# Patient Record
Sex: FEMALE | Race: BLACK OR AFRICAN AMERICAN
[De-identification: names, ages, dates, MRNs, and addresses within clinical notes are randomized per-mention and may not be internally consistent; named-entity substitution may affect disease eponyms.]

---

## 2018-06-28 ENCOUNTER — HOSPITAL ENCOUNTER (EMERGENCY)
Dept: HOSPITAL 62 - ER | Age: 78
Discharge: HOME | End: 2018-06-28
Payer: MEDICARE

## 2018-06-28 VITALS — DIASTOLIC BLOOD PRESSURE: 70 MMHG | SYSTOLIC BLOOD PRESSURE: 128 MMHG

## 2018-06-28 DIAGNOSIS — R29.810: ICD-10-CM

## 2018-06-28 DIAGNOSIS — R42: ICD-10-CM

## 2018-06-28 DIAGNOSIS — I48.91: ICD-10-CM

## 2018-06-28 DIAGNOSIS — Z85.3: ICD-10-CM

## 2018-06-28 DIAGNOSIS — R25.1: ICD-10-CM

## 2018-06-28 DIAGNOSIS — G45.9: Primary | ICD-10-CM

## 2018-06-28 LAB
ADD MANUAL DIFF: NO
ALBUMIN SERPL-MCNC: 3.8 G/DL (ref 3.5–5)
ALP SERPL-CCNC: 84 U/L (ref 38–126)
ALT SERPL-CCNC: 15 U/L (ref 9–52)
ANION GAP SERPL CALC-SCNC: 9 MMOL/L (ref 5–19)
APTT BLD: 44.5 SEC (ref 23.5–35.8)
AST SERPL-CCNC: 33 U/L (ref 14–36)
BASOPHILS # BLD AUTO: 0 10^3/UL (ref 0–0.2)
BASOPHILS NFR BLD AUTO: 1 % (ref 0–2)
BILIRUB DIRECT SERPL-MCNC: 0.7 MG/DL (ref 0–0.4)
BILIRUB SERPL-MCNC: 1 MG/DL (ref 0.2–1.3)
BUN SERPL-MCNC: 16 MG/DL (ref 7–20)
CALCIUM: 9.5 MG/DL (ref 8.4–10.2)
CHLORIDE SERPL-SCNC: 109 MMOL/L (ref 98–107)
CK MB SERPL-MCNC: 0.59 NG/ML (ref ?–4.55)
CK SERPL-CCNC: 96 U/L (ref 30–135)
CO2 SERPL-SCNC: 27 MMOL/L (ref 22–30)
EOSINOPHIL # BLD AUTO: 0 10^3/UL (ref 0–0.6)
EOSINOPHIL NFR BLD AUTO: 0.6 % (ref 0–6)
ERYTHROCYTE [DISTWIDTH] IN BLOOD BY AUTOMATED COUNT: 14.7 % (ref 11.5–14)
GLUCOSE SERPL-MCNC: 99 MG/DL (ref 75–110)
HCT VFR BLD CALC: 46.9 % (ref 36–47)
HGB BLD-MCNC: 15.5 G/DL (ref 12–15.5)
INR PPP: 1.27
LYMPHOCYTES # BLD AUTO: 1.1 10^3/UL (ref 0.5–4.7)
LYMPHOCYTES NFR BLD AUTO: 23.3 % (ref 13–45)
MCH RBC QN AUTO: 31.3 PG (ref 27–33.4)
MCHC RBC AUTO-ENTMCNC: 33 G/DL (ref 32–36)
MCV RBC AUTO: 95 FL (ref 80–97)
MONOCYTES # BLD AUTO: 0.4 10^3/UL (ref 0.1–1.4)
MONOCYTES NFR BLD AUTO: 9.3 % (ref 3–13)
NEUTROPHILS # BLD AUTO: 3.1 10^3/UL (ref 1.7–8.2)
NEUTS SEG NFR BLD AUTO: 65.8 % (ref 42–78)
PLATELET # BLD: 242 10^3/UL (ref 150–450)
POTASSIUM SERPL-SCNC: 4.4 MMOL/L (ref 3.6–5)
PROT SERPL-MCNC: 6.9 G/DL (ref 6.3–8.2)
PROTHROMBIN TIME: 16.5 SEC (ref 11.4–15.4)
RBC # BLD AUTO: 4.94 10^6/UL (ref 3.72–5.28)
SODIUM SERPL-SCNC: 145.2 MMOL/L (ref 137–145)
TOTAL CELLS COUNTED % (AUTO): 100 %
TROPONIN I SERPL-MCNC: < 0.012 NG/ML
WBC # BLD AUTO: 4.8 10^3/UL (ref 4–10.5)

## 2018-06-28 PROCEDURE — 70450 CT HEAD/BRAIN W/O DYE: CPT

## 2018-06-28 PROCEDURE — 36415 COLL VENOUS BLD VENIPUNCTURE: CPT

## 2018-06-28 PROCEDURE — 80053 COMPREHEN METABOLIC PANEL: CPT

## 2018-06-28 PROCEDURE — 85025 COMPLETE CBC W/AUTO DIFF WBC: CPT

## 2018-06-28 PROCEDURE — 82550 ASSAY OF CK (CPK): CPT

## 2018-06-28 PROCEDURE — 96360 HYDRATION IV INFUSION INIT: CPT

## 2018-06-28 PROCEDURE — 84484 ASSAY OF TROPONIN QUANT: CPT

## 2018-06-28 PROCEDURE — 85730 THROMBOPLASTIN TIME PARTIAL: CPT

## 2018-06-28 PROCEDURE — 93005 ELECTROCARDIOGRAM TRACING: CPT

## 2018-06-28 PROCEDURE — 99285 EMERGENCY DEPT VISIT HI MDM: CPT

## 2018-06-28 PROCEDURE — 85610 PROTHROMBIN TIME: CPT

## 2018-06-28 PROCEDURE — 70551 MRI BRAIN STEM W/O DYE: CPT

## 2018-06-28 PROCEDURE — 82553 CREATINE MB FRACTION: CPT

## 2018-06-28 PROCEDURE — 93010 ELECTROCARDIOGRAM REPORT: CPT

## 2018-06-28 PROCEDURE — 71045 X-RAY EXAM CHEST 1 VIEW: CPT

## 2018-06-28 NOTE — RADIOLOGY REPORT (SQ)
EXAM DESCRIPTION:  CT HEAD WITHOUT



COMPLETED DATE/TIME:  6/28/2018 9:54 am



REASON FOR STUDY:  Stroke protocol



COMPARISON:  None.



TECHNIQUE:  Axial images acquired through the brain without intravenous contrast.  Images reviewed wi
th bone, brain and subdural windows.   Images stored on PACS.

All CT scanners at this facility use dose modulation, iterative reconstruction, and/or weight based d
osing when appropriate to reduce radiation dose to as low as reasonably achievable (ALARA).

[facility]: Dose Right  [facility]: CareDose    [facility]: Dose Right    [facility]: Teradose 4D    [facility]: Smart Technologies



RADIATION DOSE:  CT Rad equipment meets quality standard of care and radiation dose reduction techniq
ues were employed. CTDIvol: 53.2 mGy. DLP: 937 mGy-cm. mGy.



LIMITATIONS:  None.



FINDINGS:  VENTRICLES: Normal size and contour.

CEREBRUM: No masses.  No hemorrhage.  No midline shift.  No evidence for acute infarction. Normal gra
y/white matter differentiation. No areas of low density in the white matter.  Basal ganglion calcific
ation not significant.

CEREBELLUM: No masses.  No hemorrhage.  No alteration of density.  No evidence for acute infarction.

EXTRAAXIAL SPACES: No fluid collections.  No masses.

ORBITS AND GLOBE: No intra- or extraconal masses.  Normal contour of globe without masses.

CALVARIUM: No fracture.

PARANASAL SINUSES: No fluid or mucosal thickening.

SOFT TISSUES: No mass or hematoma.

OTHER: No other significant finding.



IMPRESSION:  NORMAL BRAIN CT WITHOUT CONTRAST.

EVIDENCE OF ACUTE STROKE: NO.



COMMENT:   Pertinent positive or negative findings of the imaging study reported as a CRITICAL EXAM urban WELLER MD at10:12 on 6/28/2018.

Category of Critical Exam: Code stroke

Quality ID # 436: Final reports with documentation of one or more dose reduction techniques (e.g., Au
tomated exposure control, adjustment of the mA and/or kV according to patient size, use of iterative 
reconstruction technique)



TECHNICAL DOCUMENTATION:  JOB ID:  [id number]

 2011 [employer]- All Rights Reserved



Reading location - IP/workstation name: [email or web address]

## 2018-06-28 NOTE — ER DOCUMENT REPORT
ED Neuro Symptoms/Deficit





- General


Chief Complaint: S/S of Possible Stroke


Stated Complaint: LEFT SIDE PAIN


Time Seen by Provider: 06/28/18 09:33


Mode of Arrival: Ambulatory


Information source: Patient, Relative


TRAVEL OUTSIDE OF THE [place] IN LAST 30 DAYS: No





- HPI


Patient complains to provider of: Facial Droop, Speech Impairment, Other - 

TREMOR R.U.E.


Onset: Yesterday - LAST PM


Awoke with symptoms: No


Symptoms are: Intermittent episodes


Duration: More than 3 hrs


Quality of pain: No pain


Severity: Moderate


Context: denies: Insect bite, Tick bite, Falling, Head injury, Other


Loss of consciousness: No loss of consciousness


Was STROKE ALERT Called: No


Baseline Cognitive: Alert, oriented X 3


Baseline Gait: Walks w/o assistance


Pre-existing weakness: No: Face, General, Hand, Lower extremity, Upper extremity


Alert To: Name/Voice


Patient Orientation: Person


Associated symptoms: Lightheadedness.  denies: Falling injury


Similar symptoms previously: No


Recently seen / treated by doctor: No





- Related Data


Allergies/Adverse Reactions: 


 





No Known Allergies Allergy (Verified 06/28/18 09:24)


 











Past Medical History





- General


Information source: Patient, Relative





- Social History


Smoking Status: Never Smoker


Cigarette use (# per day): No


Chew tobacco use (# tins/day): No


Frequency of alcohol use: None


Drug Abuse: None


Lives with: Alone


Family History: Reviewed & Not Pertinent


Patient has suicidal ideation: No


Patient has homicidal ideation: No





- Past Medical History


Cardiac Medical History: Reports: Hx Atrial Fibrillation - TAKES ELIQUIS


Pulmonary Medical History: Reports: None


EENT Medical History: Reports: None


Neurological Medical History: Reports: None


Endocrine Medical History: Reports: None


Renal/ Medical History: Reports: None.  Denies: Hx Peritoneal Dialysis


Malignancy Medical History: Reports: Hx Breast Cancer


GI Medical History: Reports: None


Musculoskeltal Medical History: Reports None


Psychiatric Medical History: Reports: None


Past Surgical History: Reports: Hx Breast Surgery





- Immunizations


Hx Pneumococcal Vaccination: 10/01/15





Review of Systems





- Review of Systems


Constitutional: No symptoms reported


EENT: No symptoms reported


Cardiovascular: See HPI


Respiratory: No symptoms reported


Gastrointestinal: No symptoms reported


Genitourinary: No symptoms reported


Female Genitourinary: Post menopausal


Musculoskeletal: No symptoms reported


Skin: No symptoms reported


Neurological/Psychological: See HPI





Physical Exam





- Vital signs


Vitals: 


 











Temp Pulse Resp BP Pulse Ox


 


 98.3 F   72   20   135/89 H  100 


 


 06/28/18 09:31  06/28/18 09:31  06/28/18 09:31  06/28/18 09:31  06/28/18 09:31











Interpretation: Hypertensive.  No: Tachycardic, Tachypneic





- General


General appearance: Appears well, Alert


In distress: None





- HEENT


Head: Normocephalic


Eyes: Normal


Conjunctiva: Normal


Ears: Normal


Nasal: Normal


Mouth/Lips: Normal


Mucous membranes: Normal


Pharynx: Normal


Neck: Normal





- Respiratory


Respiratory status: No respiratory distress


Breath sounds: Normal





- Cardiovascular


Rhythm: Regular


Heart sounds: Normal auscultation


Murmur: No





- Abdominal


Inspection: Normal


Distension: No distension





- Extremities


General upper extremity: Normal inspection


General lower extremity: Normal inspection





- Neurological


Neuro grossly intact: Yes


Cognition: Normal


Orientation: AAOx4


Paxton Coma Scale Eye Opening: Spontaneous


Speech: Normal


Cranial nerves: Normal.  No: Facial palsy, Forehead sparing, Tongue deviation


Cerebellar coordination: Normal


Motor strength normal: LUE, RUE, LLE, RLE


Additional motor exam normals: Equal , Other - RESTING TREMOR R.U.E.


Sensory: Normal





- Psychological


Associated symptoms: Normal affect, Normal mood





- Skin


Skin Temperature: Warm


Skin Moisture: Dry


Skin Color: Normal


Skin Turgor: Elastic





Course





- Re-evaluation


Re-evalutation: 





06/28/18 16:04Patient states she feels "just fine."  Tremor and right upper 

extremity has apparently resolved.  Results of laboratory testing and 

radiographic studies discussed with patient and family.  There is some evidence 

to suggest mild dehydration, so we will administer some IV fluids, repeat 

orthostatic vital signs, and discharged to outpatient follow-up if all is 

satisfactory.


06/28/18 17:51


Patient remains asymptomatic.  Orthostatic vital signs are acceptable.  Patient'

s only complaint is that she is hungry.





- Vital Signs


Vital signs: 


 











Temp Pulse Resp BP Pulse Ox


 


 98.3 F   72   20   135/89 H  94 


 


 06/28/18 09:31  06/28/18 09:42  06/28/18 09:42  06/28/18 09:42  06/28/18 09:48














- Laboratory


Result Diagrams: 


 06/28/18 10:20





 06/28/18 11:21


Laboratory results interpreted by me: 


 











  06/28/18





  10:20


 


PT  16.5 H


 


APTT  44.5 H














- EKG Interpretation by Me


EKG shows normal: Sinus rhythm, Axis, Intervals, QRS Complexes, ST-T Waves


Rate: Normal


Rhythm: NSR


P Waves: LAE





ED Alteplase Inc/Exc Criteria





- Date/Time patient last known well:


Date/Time: 06/27/2018  2100 HRS





- Inclusion Criteria:


1: Patient presented to ED within 3 hours of acute ischemic stroke symptom onset

?


-: No


2: Did baseline CT exclude intracranial hemorrhage and/or other risk factors?


-: Yes


3: Is the age of the patient 18 years of age or greater?


-: Yes


*****: If any of the above questions are answered "NO" then stop, patient is 

not a candidate for Alteplase,


*****: If all of the above questions are answered "YES" then continue with 

Exclusion Criteria.





- Exclusion Criteria:


1: Is there evidence of intracranial hemorrhage on baseline CT?


2: Is there suspicion of subarachnoid hemorrhage (even if CT negative)?


3: Is there a history of serious head trauma, recent previous stroke or MI 

within 3 months?


4: Does the patient have a clinical presentation consistent with MI or post-MI 

pericarditis?


5: Is there history of intracranial hemorrhage?


6: On repeated measurement is Systolic BP greater than 185mmHg or Diastolic BP 

greater that 110 mmHg and is aggressive treatment needed to reduce blood 

pressure to these limits (e.g. constant infusion of an anti-hypertensive)?


7: Did the patient awake with stroke symptoms?


8: Has the patient had a lumbar puncture or an arterial puncture at a non-

compressile site within 7 days?


9: With in the last 14 days did the patient have surgery or major trauma?


10: Is the patient pregnant or postpartum less than 2 weeks?


11: Was there any active bleeding or acute trauma?


12: Does the patient have intracranial neoplasm, arteriovenous malformation or 

aneurysm?


13: Does the patient have abnormal glucose (less than 50 or greater than 400mg/

dl)?  Record glucose in Comment.


14: Patient has rapidly improving symptoms at the time Alteplase is to be 

Administered.


15: Does the patient have any risks for bleeding, including but not limited to:


a.: Current use of Coumadin with PT greater than 15 seconds or INR greater than 

1.7.


b.: Current use of Pradaxa (Dabigatran).


c.: Heparin administereed within the past 48 hours and PTT elevated.


d.: Platelet count less than 100,000/mm.


e.: Major surgery or serious trauma within 14 days.


f.: Gastrointestinal or gynecological urinary bleeding within 14 days.


g.: Myocardial Infarction (MI) within 3 months.


*****: If the answer to any of the above questions is "YES" then stop, the 

patient is not a candidate for Alteplase.


*****: If the answer to all of the above questions is "NO" then the patient may 

be eligible for the Administration of Alteplase.


*****: If the patient is noted to have seizure activity at onset of Stroke 

symptoms; Consult Neurologist for further evaluation.





- The patient is:


-: Included and is eligible to receive Alteplase.  *Initiate bed placement at 

higher level of care*


Reviewd risks & benefits of thrombolytic therapy: I have reviewed the risks and 

benefits of thrombolytic therapy with the patient and/or his/her family.


-: Excluded and not eligible to receive Alteplase for the above exclusions.


-: Excluded and not eligible to receive Alteplase for other reasons (specify in 

comments):





- Diagnosis of TIA:


-: Patient presented with transient symptoms that are now resolved and no other 

neurologic findings are currently present. List symptoms in comments.


-: Patient is NOT a candidate for tPA.


-:  ____(put name in comment)______ has been consulted for admission and 

continued evaluation of risk factor assessment.





Discharge





- Discharge


Clinical Impression: 


 Tremor, Atrial fibrillation with controlled ventricular rate





TIA (transient ischemic attack)


Qualifiers:


 Transient cerebral ischemia type: unspecified Qualified Code(s): G45.9 - 

Transient cerebral ischemic attack, unspecified





Condition: Stable


Disposition: HOME, SELF-CARE


Instructions:  Transient Ischemic Attack ([facility])


Additional Instructions: 


REST, DRINK PLENTY OF FLUIDS.


CONTINUE YOUR PRESENT MEDICATIONS AS USUAL.


FOLLOW UP WITH YOUR PRIMARY CARE PROVIDER OR RETURN TO E.R. AS NEEDED.

## 2018-06-28 NOTE — ER DOCUMENT REPORT
ED Medical Screen (RME)





- General


Chief Complaint: S/S of Possible Stroke


Stated Complaint: LEFT SIDE PAIN


Time Seen by Provider: 06/28/18 09:33


Notes: 





RAPID MEDICAL EVALUATION DISCLOSURE


I have seen this patient as part of a Rapid Medical Evaluation and, if 

applicable, placed any initially appropriate orders. The patient will be seen 

and fully evaluated, including a full history and physical exam, by a provider (

in Main ED or Fast Track) when a room becomes available.





------------------------------------------------------------------





78-year-old female here with daughter who states that yesterday (approximately 

5 PM) she noticed a left facial droop and her right arm started shaking and 

numbness.  This morning she had an episode of syncope approximately 4 hours 

ago.  Daughter also states that she is speaking very "slowed".  She has not 

tried anything for the symptoms.  She denies any prior history of stroke 

hypertension diabetes hyperlipidemia.  She does take a blood thinner, Eliquis.





EXAM


Slight right facial droop


RUE dysmetria noted


Tremulous RUE noted


Motor strength 5/5 with intact sensation LUE


Motor strength 5/5 with sensory deficit RUE


Motor strength 5/5 with intact sensation lower extremities





MDM


Patient does not meet TPA criteria, given the time and anticoagulant use


She does fall within 24 hour window for mechanical thrombectomy


Radha BAILEY to put in acute stroke order set protocol and to take straight to CT








TRAVEL OUTSIDE OF THE [place] IN LAST 30 DAYS: No





- Related Data


Allergies/Adverse Reactions: 


 





No Known Allergies Allergy (Verified 06/28/18 09:24)


 











Past Medical History


Malignancy Medical History: Reports: Hx Breast Cancer


Past Surgical History: Reports: Hx Breast Surgery





Physical Exam





- Vital signs


Vitals: 





 











Temp Pulse Resp BP Pulse Ox


 


 98.3 F   72   20   135/89 H  100 


 


 06/28/18 09:31  06/28/18 09:31  06/28/18 09:31  06/28/18 09:31  06/28/18 09:31














Course





- Vital Signs


Vital signs: 





 











Temp Pulse Resp BP Pulse Ox


 


 98.3 F   72   20   135/89 H  100 


 


 06/28/18 09:31  06/28/18 09:31  06/28/18 09:31  06/28/18 09:31  06/28/18 09:31

## 2018-06-28 NOTE — RADIOLOGY REPORT (SQ)
EXAM DESCRIPTION:  CHEST SINGLE VIEW



COMPLETED DATE/TIME:  6/28/2018 9:59 am



REASON FOR STUDY:  Stroke protocol



COMPARISON:  June 2016



EXAM PARAMETERS:  NUMBER OF VIEWS: One view.

TECHNIQUE: Single frontal radiographic view of the chest acquired.

RADIATION DOSE: NA

LIMITATIONS: None.



FINDINGS:  LUNGS AND PLEURA: No opacities, masses or pneumothorax. No pleural effusion.

MEDIASTINUM AND HILAR STRUCTURES: No masses.  Contour normal.

HEART AND VASCULAR STRUCTURES: Heart normal in size.  Normal vasculature.

BONES: No acute findings.

HARDWARE: None in the chest.

OTHER: No other significant finding.



IMPRESSION:  NO ACUTE RADIOGRAPHIC FINDING IN THE CHEST.



TECHNICAL DOCUMENTATION:  JOB ID:  [id number]

 2011 [employer] All Rights Reserved



Reading location - IP/workstation name: [email or web address]

## 2018-06-28 NOTE — RADIOLOGY REPORT (SQ)
EXAM DESCRIPTION:  MRI HEAD WITHOUT



COMPLETED DATE/TIME:  6/28/2018 1:25 pm



REASON FOR STUDY:  NEW L. FACIAL WEAKNESS, TREMOR L.U.EXT.



COMPARISON:  6/28/2018



TECHNIQUE:  Multiplanar imaging includes non-contrasted T1, T2, FLAIR, and Diffusion with ADC map seq
uences. Images stored on PACS.



LIMITATIONS:  None.



FINDINGS:  ANATOMY: No anomalies. Normal vascular flow voids. Pituitary fossa normal.

CSF SPACES: Normal in size and contour. No hemorrhage.

CEREBRUM: A few high-signal intensity lesions scattered throughout the white matter on FLAIR imaging 
with distribution suggesting chronic micro-vascular ischemic change.  Sulci and gyri normal in size a
nd contour.  No evidence of hemorrhage, mass or extraaxial fluid collection.

POSTERIOR FOSSA: No signal alteration. No hemorrhage. No edema, masses or mass effect.  Internal pamella
tory canals, cerebello-pontine angles, mastoids normal.

DIFFUSION: Negative for acute or sub-acute infarction.

ORBITS: No masses.  Globes normal.

PARANASAL SINUSES: No fluid levels.  Mucosa normal.

OTHER: No other significant finding.



IMPRESSION:  MINIMAL MICROVASCULAR ISCHEMIC CHANGE.  OTHERWISE NORMAL STUDY.

EVIDENCE OF ACUTE STROKE: NO.



TECHNICAL DOCUMENTATION:  JOB ID:  [id number]

 2011 [employer]- All Rights Reserved



Reading location - IP/workstation name: [email or web address]

## 2018-08-19 ENCOUNTER — HOSPITAL ENCOUNTER (EMERGENCY)
Dept: HOSPITAL 62 - ER | Age: 78
Discharge: HOME | End: 2018-08-19
Payer: MEDICARE

## 2018-08-19 VITALS — SYSTOLIC BLOOD PRESSURE: 139 MMHG | DIASTOLIC BLOOD PRESSURE: 120 MMHG

## 2018-08-19 DIAGNOSIS — Z79.02: ICD-10-CM

## 2018-08-19 DIAGNOSIS — I48.91: ICD-10-CM

## 2018-08-19 DIAGNOSIS — E78.00: ICD-10-CM

## 2018-08-19 DIAGNOSIS — R53.1: ICD-10-CM

## 2018-08-19 DIAGNOSIS — N39.0: ICD-10-CM

## 2018-08-19 DIAGNOSIS — R42: ICD-10-CM

## 2018-08-19 DIAGNOSIS — R25.1: Primary | ICD-10-CM

## 2018-08-19 DIAGNOSIS — Z85.3: ICD-10-CM

## 2018-08-19 DIAGNOSIS — R41.82: ICD-10-CM

## 2018-08-19 LAB
ABSOLUTE LYMPHOCYTES# (MANUAL): 1.4 10^3/UL (ref 0.5–4.7)
ABSOLUTE MONOCYTES # (MANUAL): 0.5 10^3/UL (ref 0.1–1.4)
ABSOLUTE NEUTROPHILS# (MANUAL): 3.9 10^3/UL (ref 1.7–8.2)
ADD MANUAL DIFF: YES
ALBUMIN SERPL-MCNC: 4.2 G/DL (ref 3.5–5)
ALP SERPL-CCNC: 82 U/L (ref 38–126)
ALT SERPL-CCNC: 11 U/L (ref 9–52)
ANION GAP SERPL CALC-SCNC: 17 MMOL/L (ref 5–19)
ANISOCYTOSIS BLD QL SMEAR: SLIGHT
APPEARANCE UR: (no result)
APTT PPP: YELLOW S
AST SERPL-CCNC: 31 U/L (ref 14–36)
BASOPHILS NFR BLD MANUAL: 0 % (ref 0–2)
BILIRUB DIRECT SERPL-MCNC: 0.4 MG/DL (ref 0–0.4)
BILIRUB SERPL-MCNC: 0.9 MG/DL (ref 0.2–1.3)
BILIRUB UR QL STRIP: NEGATIVE
BUN SERPL-MCNC: 10 MG/DL (ref 7–20)
BURR CELLS BLD QL SMEAR: (no result)
CALCIUM: 9.4 MG/DL (ref 8.4–10.2)
CHLORIDE SERPL-SCNC: 105 MMOL/L (ref 98–107)
CK MB SERPL-MCNC: 0.89 NG/ML (ref ?–4.55)
CK SERPL-CCNC: 135 U/L (ref 30–135)
CO2 SERPL-SCNC: 23 MMOL/L (ref 22–30)
EOSINOPHIL NFR BLD MANUAL: 0 % (ref 0–6)
ERYTHROCYTE [DISTWIDTH] IN BLOOD BY AUTOMATED COUNT: 15.1 % (ref 11.5–14)
GLUCOSE SERPL-MCNC: 85 MG/DL (ref 75–110)
GLUCOSE UR STRIP-MCNC: NEGATIVE MG/DL
HCT VFR BLD CALC: 37 % (ref 36–47)
HGB BLD-MCNC: 12.3 G/DL (ref 12–15.5)
KETONES UR STRIP-MCNC: NEGATIVE MG/DL
MCH RBC QN AUTO: 30.9 PG (ref 27–33.4)
MCHC RBC AUTO-ENTMCNC: 33.3 G/DL (ref 32–36)
MCV RBC AUTO: 93 FL (ref 80–97)
MONOCYTES % (MANUAL): 8 % (ref 3–13)
NITRITE UR QL STRIP: NEGATIVE
OVALOCYTES BLD QL SMEAR: SLIGHT
PH UR STRIP: 6 [PH] (ref 5–9)
PLATELET # BLD: 189 10^3/UL (ref 150–450)
PLATELET COMMENT: ADEQUATE
POIKILOCYTOSIS BLD QL SMEAR: (no result)
POTASSIUM SERPL-SCNC: 3.3 MMOL/L (ref 3.6–5)
PROT SERPL-MCNC: 7.8 G/DL (ref 6.3–8.2)
PROT UR STRIP-MCNC: NEGATIVE MG/DL
RBC # BLD AUTO: 4 10^6/UL (ref 3.72–5.28)
ROULEAUX BLD QL SMEAR: SLIGHT
SEGMENTED NEUTROPHILS % (MAN): 68 % (ref 42–78)
SODIUM SERPL-SCNC: 144.7 MMOL/L (ref 137–145)
SP GR UR STRIP: 1.01
TOTAL CELLS COUNTED BLD: 100
TOXIC GRANULES BLD QL SMEAR: SLIGHT
TROPONIN I SERPL-MCNC: < 0.012 NG/ML
UROBILINOGEN UR-MCNC: NEGATIVE MG/DL (ref ?–2)
VARIANT LYMPHS NFR BLD MANUAL: 20 % (ref 13–45)
WBC # BLD AUTO: 5.7 10^3/UL (ref 4–10.5)
WBC TOXIC VACUOLES BLD QL SMEAR: PRESENT

## 2018-08-19 PROCEDURE — 36415 COLL VENOUS BLD VENIPUNCTURE: CPT

## 2018-08-19 PROCEDURE — 82553 CREATINE MB FRACTION: CPT

## 2018-08-19 PROCEDURE — 87186 SC STD MICRODIL/AGAR DIL: CPT

## 2018-08-19 PROCEDURE — 87088 URINE BACTERIA CULTURE: CPT

## 2018-08-19 PROCEDURE — 82550 ASSAY OF CK (CPK): CPT

## 2018-08-19 PROCEDURE — 93010 ELECTROCARDIOGRAM REPORT: CPT

## 2018-08-19 PROCEDURE — 70450 CT HEAD/BRAIN W/O DYE: CPT

## 2018-08-19 PROCEDURE — 96374 THER/PROPH/DIAG INJ IV PUSH: CPT

## 2018-08-19 PROCEDURE — 84484 ASSAY OF TROPONIN QUANT: CPT

## 2018-08-19 PROCEDURE — 81001 URINALYSIS AUTO W/SCOPE: CPT

## 2018-08-19 PROCEDURE — 99285 EMERGENCY DEPT VISIT HI MDM: CPT

## 2018-08-19 PROCEDURE — 80053 COMPREHEN METABOLIC PANEL: CPT

## 2018-08-19 PROCEDURE — 85025 COMPLETE CBC W/AUTO DIFF WBC: CPT

## 2018-08-19 PROCEDURE — 87086 URINE CULTURE/COLONY COUNT: CPT

## 2018-08-19 PROCEDURE — 93005 ELECTROCARDIOGRAM TRACING: CPT

## 2018-08-19 NOTE — RADIOLOGY REPORT (SQ)
EXAM DESCRIPTION:  CT HEAD WITHOUT



COMPLETED DATE/TIME:  8/19/2018 8:11 pm



REASON FOR STUDY:  Right sided weakness



COMPARISON:  6/28/2018



TECHNIQUE:  Axial images acquired through the brain without intravenous contrast.  Images reviewed wi
th bone, brain and subdural windows.  Additional sagittal and coronal reconstructions were generated.
 Images stored on PACS.

All CT scanners at this facility use dose modulation, iterative reconstruction, and/or weight based d
osing when appropriate to reduce radiation dose to as low as reasonably achievable (ALARA).

[facility]: Dose Right  [facility]: CareDose    [facility]: Dose Right    [facility]: Teradose 4D    [facility]: Smart [employer]



RADIATION DOSE:  CT Rad equipment meets quality standard of care and radiation dose reduction techniq
ues were employed. CTDIvol: 53.2 mGy. DLP: 1017 mGy-cm. mGy.



LIMITATIONS:  None.



FINDINGS:  VENTRICLES: Normal size and contour.

CEREBRUM: No masses.  No hemorrhage.  No midline shift.  No evidence for acute infarction. Normal gra
y/white matter differentiation. No areas of low density in the white matter.

CEREBELLUM: No masses.  No hemorrhage.  No alteration of density.  No evidence for acute infarction.

EXTRAAXIAL SPACES: No fluid collections.  No masses.

ORBITS AND GLOBE: No intra- or extraconal masses.  Normal contour of globe without masses.

CALVARIUM: No fracture.

PARANASAL SINUSES: No fluid or mucosal thickening.

SOFT TISSUES: No mass or hematoma.

OTHER: No other significant finding.



IMPRESSION:  NORMAL BRAIN CT WITHOUT CONTRAST.

EVIDENCE OF ACUTE STROKE: NO.



COMMENT:  Quality ID # 436: Final reports with documentation of one or more dose reduction techniques
 (e.g., Automated exposure control, adjustment of the mA and/or kV according to patient size, use of 
iterative reconstruction technique)



TECHNICAL DOCUMENTATION:  JOB ID:  [id number]

 2011 [employer]- All Rights Reserved



Reading location - IP/workstation name: [email or web address]

## 2018-08-19 NOTE — ER DOCUMENT REPORT
ED General





- General


Mode of Arrival: Ambulatory


Information source: Patient


TRAVEL OUTSIDE OF THE [place] IN LAST 30 DAYS: No





<GENNA ALMEIDA - Last Filed: 08/19/18 19:20>





<WILLY WELCH - Last Filed: 08/19/18 22:39>





- General


Chief Complaint: Weakness


Stated Complaint: DIZZY, WEAK


Time Seen by Provider: 08/19/18 18:04


Notes: 


Patient is a 78 year old female presenting to the emergency department 

complaining of weakness and a right upper extremity tremor. Patient states she 

felt weak and had small right upper extremity tremor last night but states her 

symptoms worsened this morning. She also states she began to feel a little 

dizzy this morning as well.  





Patient presented to this emergency department on 6/25/18 for similar symptoms 

and had a negative MRI for a stroke. Patient was diagnosed with a TIA due to 

her symptoms.  (GENNA ALMEIDA)











At this time the patient does not have inclusion criteria for TPA.  There is no 

focal weakness demonstrated.  The patient has a tremor in the right upper 

extremity that started last night and worsened today. (WILLY WELCH)





- Related Data


Allergies/Adverse Reactions: 


 





No Known Allergies Allergy (Verified 06/28/18 09:24)


 











Past Medical History





- General


Information source: Patient, Relative





- Social History


Smoking Status: Never Smoker


Chew tobacco use (# tins/day): No


Drug Abuse: None


Family History: Reviewed & Not Pertinent


Patient has suicidal ideation: No


Patient has homicidal ideation: No





- Past Medical History


Cardiac Medical History: Reports: Hx Atrial Fibrillation - TAKES ELIQUIS, Hx 

Hypercholesterolemia, Hx Hypertension


Malignancy Medical History: Reports: Hx Breast Cancer


Past Surgical History: Reports: Hx Breast Surgery





- Immunizations


Hx Pneumococcal Vaccination: 10/01/15





<GENNA ALMEIDA - Last Filed: 08/19/18 19:20>





Review of Systems





- Review of Systems


Constitutional: See HPI, Weakness


EENT: No symptoms reported


Cardiovascular: See HPI, Dizziness


Respiratory: No symptoms reported


Gastrointestinal: No symptoms reported


Genitourinary: No symptoms reported


Female Genitourinary: No symptoms reported


Musculoskeletal: See HPI


Skin: No symptoms reported


Hematologic/Lymphatic: No symptoms reported


Neurological/Psychological: See HPI, Tremor


-: Yes All other systems reviewed and negative





<GENNA ALMEIDA - Last Filed: 08/19/18 19:20>





Physical Exam





- General


General appearance: Appears well, Alert


In distress: None





- HEENT


Head: Normocephalic, Atraumatic


Eyes: Normal


Conjunctiva: Normal


Extraocular movements intact: Yes


Pupils: PERRL


Neck: Normal





- Respiratory


Respiratory status: No respiratory distress


Chest status: Nontender


Breath sounds: Normal


Chest palpation: Normal





- Cardiovascular


Rhythm: Regular


Heart sounds: Normal auscultation


Murmur: No


Friction rub: No


Gallop: None auscultated





- Abdominal


Inspection: Normal


Distension: No distension


Bowel sounds: Normal


Tenderness: Nontender


Organomegaly: No organomegaly





- Back


Back: Normal





- Extremities


General upper extremity: Normal ROM


General lower extremity: Normal ROM





- Neurological


Neuro grossly intact: Yes


Cognition: Normal


Orientation: AAOx4


[place] Coma Scale Eye Opening: Spontaneous


[place] Coma Scale Verbal: Oriented


[place] Coma Scale Motor: Obeys Commands


Sandra Coma Scale Total: 15


Speech: Normal


Cranial nerves: Tongue deviation, Other - No faical motor deficits when smiling.


Motor strength normal: LUE, RUE - Tremor, LLE, RLE


Additional motor exam normals: Equal 


Sensory: Normal





- Psychological


Associated symptoms: Normal affect, Normal mood





- Skin


Skin Temperature: Warm


Skin Moisture: Dry


Skin Color: Normal





<GENNA ALMEIDA - Last Filed: 08/19/18 19:20>





- Neurological


Motor strength normal: RUE - Tremor, that can be calmed down by diverting the 

patient's attention, or with the nurse holding the patient's forearm firmly 

while trying to start an IV.





<WILLY WELCH - Last Filed: 08/19/18 22:39>





- Vital signs


Vitals: 


 











Temp Pulse Resp BP Pulse Ox


 


 98.8 F   59 L  16   146/74 H  100 


 


 08/19/18 17:37  08/19/18 17:37  08/19/18 17:37  08/19/18 17:37  08/19/18 17:37














Course





<GENNA ALMEIDA - Last Filed: 08/19/18 19:20>





- Laboratory


Result Diagrams: 


 08/19/18 20:50





 08/19/18 20:50





- Diagnostic Test


Radiology reviewed: Image reviewed, Reports reviewed - CT scan of the head does 

not show any acute findings.





- EKG Interpretation by Me


EKG shows normal: Sinus rhythm, Axis, Intervals, QRS Complexes, ST-T Waves


Rate: Normal - 51


Rhythm: NSR


When compared to previous EKG there are: No significant change





<WILLY WELCH - Last Filed: 08/19/18 22:39>





- Re-evaluation


Re-evalutation: 





08/19/18 22:32


The right upper extremity tremors improved and went away after the small dose 

of Ativan IV.


At this time the patient feels much better, she is completely alert and 

oriented.  I informed her and her visitor that she appears to have a urinary 

tract infection.


That could be the reason for her confusion earlier. (WILLY WELCH)





- Vital Signs


Vital signs: 


 











Temp Pulse Resp BP Pulse Ox


 


 98.8 F   59 L  17   141/86 H  100 


 


 08/19/18 17:37  08/19/18 17:37  08/19/18 19:49  08/19/18 19:49  08/19/18 19:49














- Laboratory


Laboratory results interpreted by me: 


 











  08/19/18 08/19/18 08/19/18





  20:50 20:50 21:03


 


RDW  15.1 H  


 


Potassium   3.3 L 


 


Est GFR (Non-Af Amer)   58 L 


 


Ur Leukocyte Esterase    LARGE H














Discharge





<GENNA ALMEIDA - Last Filed: 08/19/18 19:20>





<WILLY WELCH - Last Filed: 08/19/18 22:39>





- Discharge


Clinical Impression: 


 Tremor of right hand





Altered mental status


Qualifiers:


 Altered mental status type: unspecified Qualified Code(s): R41.82 - Altered 

mental status, unspecified





Urinary tract infection


Qualifiers:


 Urinary tract infection type: site unspecified Hematuria presence: without 

hematuria Qualified Code(s): N39.0 - Urinary tract infection, site not specified





Condition: Stable


Disposition: HOME, SELF-CARE


Additional Instructions: 


Urinary Tract Infection:





     Your evaluation indicates that you have a urinary tract infection. This is 

due to germs growing in the bladder.  This is a common problem.


     This infection usually responds quickly to antibiotics.  Your antibiotic 

should be taken exactly as prescribed.  Drink plenty of fluids -- three to four 

quarts a day.


     Certain urine infections require a culture.  If the doctor obtained a 

culture, the results will be back in two days.  You should call to see if a 

change in treatment is needed.


     A repeat urinalysis after you finish treatment is often recommended.  The 

physician will let you know if further testing is required.


     Call the doctor if you develop fever, chills, flank pain, inability to 

urinate, or blood in the urine.





********************************************************************************

****************************************





Your evaluation today suggests that you have a urinary tract infection.


In an elderly person, the urinary tract infection can cause confusion, mental 

status changes and worsening of any underlying neurological problems.


You did have tremor involving the same hand when he was seen here a few months 

ago.


     At this time it is not possible to tell if that was related to anxiety, TIA

, or underlying tremor disorder that is only occasionally manifested at this 

time.





You should take the antibiotics as prescribed.


Drink plenty of fluids.


Follow-up with your doctor Monday or Tuesday for recheck and to check on the 

urine culture results.





RETURN TO THE EMERGENCY ROOM IF ANY NEW OR WORSENING SYMPTOMS.








Prescriptions: 


Cephalexin Monohydrate [Keflex 500 mg Capsule] 500 mg PO TID #15 capsule


Referrals: 


JOSE BERMUDEZ MD [Primary Care Provider] - Follow up tomorrow


Scribe Attestation: 





08/19/18 19:32


I personally performed the services described in the documentation, reviewed 

and edited the documentation which was dictated to the scribe in my presence, 

and it accurately records my words and actions. (WILLY WELCH)





Scribe Documentation





- Scribe


Written by Hussain:: Hussain Arteaga, 8/19/2018 19:19


acting as scribe for :: Tiffanie





<GENNA ALMEIDA - Last Filed: 08/19/18 19:20>

## 2018-12-24 ENCOUNTER — HOSPITAL ENCOUNTER (EMERGENCY)
Dept: HOSPITAL 62 - ER | Age: 78
Discharge: HOME | End: 2018-12-24
Payer: MEDICARE

## 2018-12-24 VITALS — DIASTOLIC BLOOD PRESSURE: 43 MMHG | SYSTOLIC BLOOD PRESSURE: 138 MMHG

## 2018-12-24 DIAGNOSIS — E78.00: ICD-10-CM

## 2018-12-24 DIAGNOSIS — I10: ICD-10-CM

## 2018-12-24 DIAGNOSIS — Z79.01: ICD-10-CM

## 2018-12-24 DIAGNOSIS — R07.89: Primary | ICD-10-CM

## 2018-12-24 DIAGNOSIS — Z85.3: ICD-10-CM

## 2018-12-24 DIAGNOSIS — I48.91: ICD-10-CM

## 2018-12-24 LAB
ADD MANUAL DIFF: NO
ALBUMIN SERPL-MCNC: 3.7 G/DL (ref 3.5–5)
ALP SERPL-CCNC: 95 U/L (ref 38–126)
ALT SERPL-CCNC: 17 U/L (ref 9–52)
ANION GAP SERPL CALC-SCNC: 9 MMOL/L (ref 5–19)
AST SERPL-CCNC: 20 U/L (ref 14–36)
BASOPHILS # BLD AUTO: 0 10^3/UL (ref 0–0.2)
BASOPHILS NFR BLD AUTO: 0.8 % (ref 0–2)
BILIRUB DIRECT SERPL-MCNC: 0.2 MG/DL (ref 0–0.4)
BILIRUB SERPL-MCNC: 0.9 MG/DL (ref 0.2–1.3)
BUN SERPL-MCNC: 15 MG/DL (ref 7–20)
CALCIUM: 9.4 MG/DL (ref 8.4–10.2)
CHLORIDE SERPL-SCNC: 108 MMOL/L (ref 98–107)
CK MB SERPL-MCNC: 0.3 NG/ML (ref ?–4.55)
CK SERPL-CCNC: 85 U/L (ref 30–135)
CO2 SERPL-SCNC: 25 MMOL/L (ref 22–30)
EOSINOPHIL # BLD AUTO: 0 10^3/UL (ref 0–0.6)
EOSINOPHIL NFR BLD AUTO: 0.7 % (ref 0–6)
ERYTHROCYTE [DISTWIDTH] IN BLOOD BY AUTOMATED COUNT: 14.7 % (ref 11.5–14)
GLUCOSE SERPL-MCNC: 90 MG/DL (ref 75–110)
HCT VFR BLD CALC: 41.8 % (ref 36–47)
HGB BLD-MCNC: 13.9 G/DL (ref 12–15.5)
LYMPHOCYTES # BLD AUTO: 0.9 10^3/UL (ref 0.5–4.7)
LYMPHOCYTES NFR BLD AUTO: 19.1 % (ref 13–45)
MCH RBC QN AUTO: 30.9 PG (ref 27–33.4)
MCHC RBC AUTO-ENTMCNC: 33.2 G/DL (ref 32–36)
MCV RBC AUTO: 93 FL (ref 80–97)
MONOCYTES # BLD AUTO: 0.4 10^3/UL (ref 0.1–1.4)
MONOCYTES NFR BLD AUTO: 7.7 % (ref 3–13)
NEUTROPHILS # BLD AUTO: 3.6 10^3/UL (ref 1.7–8.2)
NEUTS SEG NFR BLD AUTO: 71.7 % (ref 42–78)
PLATELET # BLD: 178 10^3/UL (ref 150–450)
POTASSIUM SERPL-SCNC: 3.5 MMOL/L (ref 3.6–5)
PROT SERPL-MCNC: 6.8 G/DL (ref 6.3–8.2)
RBC # BLD AUTO: 4.49 10^6/UL (ref 3.72–5.28)
SODIUM SERPL-SCNC: 141.8 MMOL/L (ref 137–145)
TOTAL CELLS COUNTED % (AUTO): 100 %
TROPONIN I SERPL-MCNC: < 0.012 NG/ML
WBC # BLD AUTO: 5 10^3/UL (ref 4–10.5)

## 2018-12-24 PROCEDURE — 93005 ELECTROCARDIOGRAM TRACING: CPT

## 2018-12-24 PROCEDURE — 82550 ASSAY OF CK (CPK): CPT

## 2018-12-24 PROCEDURE — 82553 CREATINE MB FRACTION: CPT

## 2018-12-24 PROCEDURE — 93010 ELECTROCARDIOGRAM REPORT: CPT

## 2018-12-24 PROCEDURE — 99285 EMERGENCY DEPT VISIT HI MDM: CPT

## 2018-12-24 PROCEDURE — 71045 X-RAY EXAM CHEST 1 VIEW: CPT

## 2018-12-24 PROCEDURE — 36415 COLL VENOUS BLD VENIPUNCTURE: CPT

## 2018-12-24 PROCEDURE — 84484 ASSAY OF TROPONIN QUANT: CPT

## 2018-12-24 PROCEDURE — 80053 COMPREHEN METABOLIC PANEL: CPT

## 2018-12-24 PROCEDURE — 85025 COMPLETE CBC W/AUTO DIFF WBC: CPT

## 2018-12-24 NOTE — ER DOCUMENT REPORT
ED General





- General


Stated Complaint: CHEST PAINS


Time Seen by Provider: 12/24/18 09:50


TRAVEL OUTSIDE OF THE [place] IN LAST 30 DAYS: No





- HPI


Notes: 





Patient is a 78-year-old female with a history of paroxysmal atrial fibrillation

and on Eliquis who presents to the ED complaining of 15 minutes of right-sided 

chest pain this morning that has since resolved.  Patient states that it was a 

tightness and sharp pain at the same time.  Patient states that she has not had 

this pain before.  She has no significant cardiopulmonary medical history 

otherwise.  The pain did not radiate.  Denies any drug allergies.  Patient 

states that she has been able to ambulate without any worsening symptoms or 

dyspnea on exertion.  She is urinating normally and having normal bowel 

movements.  She is eating and drinking without difficulty.  No smoking history. 

Patient did not have any associated dizziness, nausea, or sweats.  Denies any 

prolonged immobilization, distance travel, recent surgery/trauma, personal 

cancer history, hormone use, smoking, or previous DVT/PE.  Denies any headache, 

fever, neck pain, URI, sore throat, current chest pain, palpitations, syncope, 

cough, shortness of breath, wheeze, dyspnea, abdominal pain, nausea/vomi

ting/diarrhea, urinary retention, dysuria, hematuria, loss of control of bowel 

or bladder, numbness/tingling, saddle anesthesia, muscle paralysis/weakness, or 

rash. 





Pt and daughter state negative stress test <8mos ago with Dr. Mascorro.





- Related Data


Allergies/Adverse Reactions: 


                                        





No Known Allergies Allergy (Verified 06/28/18 09:24)


   











Past Medical History





- Social History


Smoking Status: Never Smoker


Family History: Reviewed & Not Pertinent





- Past Medical History


Cardiac Medical History: Reports: Hx Atrial Fibrillation - TAKES ELIQUIS, Hx 

Hypercholesterolemia, Hx Hypertension


Renal/ Medical History: Denies: Hx Peritoneal Dialysis


Malignancy Medical History: Reports: Hx Breast Cancer


Past Surgical History: Reports: Hx Breast Surgery





- Immunizations


Hx Pneumococcal Vaccination: 10/01/15





Review of Systems





- Review of Systems


-: Yes All other systems reviewed and negative





Physical Exam





- Vital signs


Vitals: 


                                        











Temp Pulse Resp BP Pulse Ox


 


 98.3 F   52 L  23 H  157/70 H  97 


 


 12/24/18 09:02  12/24/18 09:02  12/24/18 09:02  12/24/18 09:02  12/24/18 09:02














- Notes


Notes: 





PHYSICAL EXAMINATION:





GENERAL: Well-appearing, well-nourished and in no acute distress.  A&Ox4.  

Answers questions appropriately.





HEAD: Atraumatic, normocephalic.





EYES: Pupils equal round and reactive to light, extraocular movements intact, 

sclera anicteric, conjunctiva are normal.





ENT:  Nares patent and without discharge.  oropharynx clear without exudates.  

No tonsilar hypertrophy or erythema.  Moist mucous membranes. 





NECK: Normal range of motion, supple without lymphadenopathy





Chest:  no flail chest.  





LUNGS: Breath sounds clear to auscultation bilaterally and equal.  No wheezes 

rales or rhonchi.





HEART: Regular rate and rhythm without murmurs, rubs, gallops.





ABDOMEN: Soft, nontender, nondistended abdomen.  No guarding, no rebound.  No 

masses appreciated.  Normal bowel sounds present.  No CVA tenderness 

bilaterally.





Musculoskeletal: FROM to passive/active. Strength 5+/5. Yoon neg.  No asymmetry

to LE's.





Extremities:  No cyanosis, clubbing, or edema b/l.  Peripheral pulses 2+.  

Capillary refill less than 3 seconds.





NEUROLOGICAL: Normal speech, normal gait.  





PSYCH: Normal mood, normal affect.





SKIN: Warm, Dry, normal turgor, no rashes or lesions noted.





Course





- Re-evaluation


Re-evalutation: 





12/24/18 16:24


Patient is an afebrile, well-hydrated 78-year-old female who presents to the ED 

with resolved chest pain, unspecified.  Vitals are acceptable without any 

significant tachycardia, tachypnea, or hypoxia.  PE is otherwise unremarkable.  

Patient is nontoxic-appearing and is tolerating p.o. without any difficulties.  

Pt is currently asymptomatic and has been since 15 minutes after initial onset 

when it occurred.  CBC, CMP, EKG/cardiac enzymes 2, chest x-ray are all 

unremarkable for any acute pathology.  Patient has a heart score of 3, Wells 

score of 0.  Patient does not have any chest pain, dyspnea, or shortness of 

breath.  She had a negative stress test reported within the last 8 months per 

daughter and patient.  Patient's presentation and symptomatology creates low 

suspicion for ACS, PE, pneumothorax, pericarditis, dissection, respiratory 

compromise, severe dehydration, sepsis, meningitis, or other systemic emergent 

condition at this time.  Patient is aware that this condition can change from 

initial presentation and she needs to monitor symptoms closely and seek medical 

attention for any acute changes.  Pt is feeling better and would like to go 

home.  Recommend conservative measures for symptoms.  Recheck with your PCM in 

2-3 days. Consider consult with Cardiology.  Return to the ED with any 

worsening/concerning symptoms otherwise as reviewed in discharge.  Patient is in

agreement.





- Vital Signs


Vital signs: 


                                        











Temp Pulse Resp BP Pulse Ox


 


 98.3 F   54 L  20   144/64 H  100 


 


 12/24/18 09:02  12/24/18 09:05  12/24/18 15:01  12/24/18 15:01  12/24/18 15:01














- Laboratory


Result Diagrams: 


                                 12/24/18 09:25





                                 12/24/18 10:30


Laboratory results interpreted by me: 


                                        











  12/24/18 12/24/18





  09:25 10:30


 


RDW  14.7 H 


 


Potassium   3.5 L


 


Chloride   108 H














Discharge





- Discharge


Clinical Impression: 


 Atypical chest pain





Condition: Stable


Disposition: HOME, SELF-CARE


Instructions:  Chest Pain of Unclear Cause ([facility])


Additional Instructions: 


Maintain adequate fluid and food intake


Take home medications as directed


Healthy diet


Monitor blood pressure daily and keep a log


Monitor symptoms for any acute changes


Recheck with your PCM in 3-5 days


Consider a follow-up with cardiology 


Return to the ED with any worsening symptoms and/or development of fever, 

headache, chest pain, palpitations, syncope, shortness of breath, trouble breat

santos, abdominal pain, n/v/d, blood in stool/urine, loss of control of 

bowel/bladder, urinary retention, muscle weakness/paralysis, numbness/tingling, 

or other worsening symptoms that are concerning to you.


Forms:  Elevated Blood Pressure


Referrals: 


TYE MASCORRO MD [ACTIVE STAFF] - Follow up as needed

## 2018-12-24 NOTE — RADIOLOGY REPORT (SQ)
EXAM DESCRIPTION:  CHEST SINGLE VIEW



COMPLETED DATE/TIME:  12/24/2018 9:44 am



REASON FOR STUDY:  BED 19 CP



COMPARISON:  6/9/2016



EXAM PARAMETERS:  NUMBER OF VIEWS: One view.

TECHNIQUE: Single frontal radiographic view of the chest acquired.

RADIATION DOSE: NA

LIMITATIONS: None.



FINDINGS:  LUNGS AND PLEURA:  Stable slight left apical pleural thickening/ scar.  No acute pulmonary
 consolidation.  No pneumothorax or pleural effusion.  Stable mild elevation of the right hemidiaphra
gm.

MEDIASTINUM AND HILAR STRUCTURES: No masses.  Contour normal.

HEART AND VASCULAR STRUCTURES: Heart normal in size.  Normal vasculature.

BONES: No acute findings.

HARDWARE:  Interval removal of right Vlngfw-K-Jhkk catheter.

OTHER:  Prior left breast surgery and axillary dissection.



IMPRESSION:  1.  Interval removal of right Cjwzun-E-Qlip catheter since the prior study dated 6/9/201
6.

2.  Stable chronic changes in the lungs.  No acute pulmonary findings.



TECHNICAL DOCUMENTATION:  JOB ID:  [id number]

 2011 [employer]- All Rights Reserved



Reading location - IP/workstation name: [email or web address]

## 2020-01-25 ENCOUNTER — HOSPITAL ENCOUNTER (EMERGENCY)
Dept: HOSPITAL 62 - ER | Age: 80
Discharge: HOME | End: 2020-01-25
Payer: MEDICARE

## 2020-01-25 VITALS — DIASTOLIC BLOOD PRESSURE: 65 MMHG | SYSTOLIC BLOOD PRESSURE: 131 MMHG

## 2020-01-25 DIAGNOSIS — Z85.3: ICD-10-CM

## 2020-01-25 DIAGNOSIS — Z79.02: ICD-10-CM

## 2020-01-25 DIAGNOSIS — I48.91: ICD-10-CM

## 2020-01-25 DIAGNOSIS — E78.00: ICD-10-CM

## 2020-01-25 DIAGNOSIS — I10: ICD-10-CM

## 2020-01-25 DIAGNOSIS — R07.9: Primary | ICD-10-CM

## 2020-01-25 LAB
ADD MANUAL DIFF: NO
ALBUMIN SERPL-MCNC: 4.3 G/DL (ref 3.5–5)
ALP SERPL-CCNC: 103 U/L (ref 38–126)
ANION GAP SERPL CALC-SCNC: 13 MMOL/L (ref 5–19)
AST SERPL-CCNC: 31 U/L (ref 14–36)
BASOPHILS # BLD AUTO: 0.1 10^3/UL (ref 0–0.2)
BASOPHILS NFR BLD AUTO: 0.7 % (ref 0–2)
BILIRUB DIRECT SERPL-MCNC: 0.3 MG/DL (ref 0–0.4)
BILIRUB SERPL-MCNC: 1 MG/DL (ref 0.2–1.3)
BUN SERPL-MCNC: 15 MG/DL (ref 7–20)
CALCIUM: 10 MG/DL (ref 8.4–10.2)
CHLORIDE SERPL-SCNC: 104 MMOL/L (ref 98–107)
CO2 SERPL-SCNC: 25 MMOL/L (ref 22–30)
EOSINOPHIL # BLD AUTO: 0 10^3/UL (ref 0–0.6)
EOSINOPHIL NFR BLD AUTO: 0.4 % (ref 0–6)
ERYTHROCYTE [DISTWIDTH] IN BLOOD BY AUTOMATED COUNT: 14.7 % (ref 11.5–14)
GLUCOSE SERPL-MCNC: 105 MG/DL (ref 75–110)
HCT VFR BLD CALC: 43.7 % (ref 36–47)
HGB BLD-MCNC: 14.7 G/DL (ref 12–15.5)
LYMPHOCYTES # BLD AUTO: 1.2 10^3/UL (ref 0.5–4.7)
LYMPHOCYTES NFR BLD AUTO: 13.8 % (ref 13–45)
MCH RBC QN AUTO: 31.5 PG (ref 27–33.4)
MCHC RBC AUTO-ENTMCNC: 33.6 G/DL (ref 32–36)
MCV RBC AUTO: 94 FL (ref 80–97)
MONOCYTES # BLD AUTO: 0.7 10^3/UL (ref 0.1–1.4)
MONOCYTES NFR BLD AUTO: 8.2 % (ref 3–13)
NEUTROPHILS # BLD AUTO: 6.8 10^3/UL (ref 1.7–8.2)
NEUTS SEG NFR BLD AUTO: 76.9 % (ref 42–78)
PLATELET # BLD: 262 10^3/UL (ref 150–450)
POTASSIUM SERPL-SCNC: 3.6 MMOL/L (ref 3.6–5)
PROT SERPL-MCNC: 7.7 G/DL (ref 6.3–8.2)
RBC # BLD AUTO: 4.66 10^6/UL (ref 3.72–5.28)
TOTAL CELLS COUNTED % (AUTO): 100 %
WBC # BLD AUTO: 8.8 10^3/UL (ref 4–10.5)

## 2020-01-25 PROCEDURE — 85025 COMPLETE CBC W/AUTO DIFF WBC: CPT

## 2020-01-25 PROCEDURE — 93005 ELECTROCARDIOGRAM TRACING: CPT

## 2020-01-25 PROCEDURE — 93010 ELECTROCARDIOGRAM REPORT: CPT

## 2020-01-25 PROCEDURE — 84484 ASSAY OF TROPONIN QUANT: CPT

## 2020-01-25 PROCEDURE — 80053 COMPREHEN METABOLIC PANEL: CPT

## 2020-01-25 PROCEDURE — 71046 X-RAY EXAM CHEST 2 VIEWS: CPT

## 2020-01-25 PROCEDURE — 36415 COLL VENOUS BLD VENIPUNCTURE: CPT

## 2020-01-25 PROCEDURE — 99285 EMERGENCY DEPT VISIT HI MDM: CPT

## 2020-01-25 NOTE — ER DOCUMENT REPORT
ED General





- General


Chief Complaint: Chest Pain


Stated Complaint: CHEST PAIN


Time Seen by Provider: 01/25/20 13:46


TRAVEL OUTSIDE OF THE [place] IN LAST 30 DAYS: No





- HPI


Notes: 





Patient is a 79-year-old female who presents to the emergency department for 

evaluation.  She states that yesterday she started developing a tightness 

sensation in her chest.  It was intermittent.  She states that she was at rest 

at onset.  It seems to come and go.  Nothing seems to make it better, nothing 

seems to make it worse.  She denies any associated shortness of breath, nausea, 

diaphoresis, near syncope.  She states that she is never had anything like this 

in the past.  She does have a diagnosis of bronchitis recently.  She was placed 

on medicine for that, states overall it is improving.  Patient actually had a 

stress test a few months ago, is supposed to follow-up with Dr. Juaerz on Monday 

in regards to results.





- Related Data


Allergies/Adverse Reactions: 


                                        





No Known Allergies Allergy (Verified 01/25/20 13:43)


   








Home Medications: List reviewed, please see chart





Past Medical History





- General


Information source: Patient





- Social History


Smoking Status: Never Smoker


Chew tobacco use (# tins/day): No


Frequency of alcohol use: None


Drug Abuse: None


Family History: Reviewed & Not Pertinent


Patient has suicidal ideation: No


Patient has homicidal ideation: No





- Past Medical History


Cardiac Medical History: Reports: Hx Atrial Fibrillation - TAKES ELIQUIS, Hx 

Hypercholesterolemia, Hx Hypertension


Renal/ Medical History: Denies: Hx Peritoneal Dialysis


Malignancy Medical History: Reports: Hx Breast Cancer


Past Surgical History: Reports: Hx Breast Surgery





- Immunizations


Hx Pneumococcal Vaccination: 10/01/15





Review of Systems





- Review of Systems


Constitutional: No symptoms reported


EENT: No symptoms reported


Cardiovascular: See HPI


Respiratory: See HPI


Gastrointestinal: No symptoms reported


Genitourinary: No symptoms reported


Musculoskeletal: No symptoms reported


Skin: No symptoms reported


Neurological/Psychological: No symptoms reported





Physical Exam





- Vital signs


Vitals: 


                                        











Temp Pulse Resp BP Pulse Ox


 


 98.3 F   66   16   137/53 H  99 


 


 01/25/20 13:15  01/25/20 13:15  01/25/20 13:15  01/25/20 13:15  01/25/20 13:15














- Notes


Notes: 





This is a pleasant 79-year-old female who appears her stated age in no acute 

distress.  She has an intermittent wet cough, but no clear signs of respiratory 

distress.  Vital signs reviewed, please refer to chart. Head is normocephalic, 

atraumatic.  Pupils equal round, reactive to light.  Neck is supple without 

meningismus.  Heart is regular rate and rhythm.  Lungs are clear to auscultation

bilaterally.  Abdomen is soft, nontender, normoactive bowel sounds throughout.  

Extremities without cyanosis, clubbing. Posterior calves are nontender.  

Peripheral pulses are equal.  Skin is warm and dry.  Patient is awake, alert, 

neurological exam is nonfocal.





Course





- Re-evaluation


Re-evalutation: 





01/25/20 18:20


Patient presents emergency department for evaluation.  She was initially seen 

through triage.  She had laboratory investigations as ordered.  The patient 

states she does not have any other chest tightness at the time of my evaluation.

 Her laboratory investigations thus far are unremarkable.  Given her age and 

other risk factors, I do believe that a second troponin is appropriate.  This is

ordered and pending at this time.  She already has a follow-up with her 

cardiologist on Monday.  Plan to discharge the patient if the second troponin is

negative were explained to the patient and her daughter, and they are amenable 

to this.  We will continue to monitor.


01/25/20 19:45


Second troponin is negative.  Patient remains chest pain-free.  She is to keep 

her appointment on Monday with her cardiologist, return to the ED with wo

rsening.





- Vital Signs


Vital signs: 


                                        











Temp Pulse Resp BP Pulse Ox


 


 98.3 F   66   19   113/72   100 


 


 01/25/20 13:15  01/25/20 13:15  01/25/20 18:03  01/25/20 18:03  01/25/20 18:03














- Laboratory


Result Diagrams: 


                                 01/25/20 14:15





                                 01/25/20 14:15


Laboratory results interpreted by me: 


                                        











  01/25/20





  14:15


 


RDW  14.7 H














- EKG Interpretation by Me


Additional EKG results interpreted by me: 





01/25/20 19:46


Sinus mechanism with a rate of 65 bpm.  Normal axis and intervals.  Nonspecific 

ST changes, but no acute changes concerning for infarction.  No change from 

prior study.





Discharge





- Discharge


Clinical Impression: 


 Chest pain





Condition: Stable


Disposition: HOME, SELF-CARE


Instructions:  Chest Pain of Unclear Cause ([facility])


Additional Instructions: 


No clear cause was found for your chest pain today.  Please follow-up as 

scheduled with your cardiologist on Monday.  If your pain returns, or you 

develop new or concerning symptoms of any sort, please return immediately to the

emergency department for evaluation.

## 2020-01-25 NOTE — RADIOLOGY REPORT (SQ)
EXAM DESCRIPTION:  CHEST 2 VIEWS



COMPLETED DATE/TIME:  1/25/2020 2:03 pm



REASON FOR STUDY:  cough, chest pain



COMPARISON:  Chest films 6/9/2016, 6/28/2018, 12/24/2018



EXAM PARAMETERS:  NUMBER OF VIEWS: two views

TECHNIQUE: Digital Frontal and Lateral radiographic views of the chest acquired.

RADIATION DOSE: NA

LIMITATIONS: none



FINDINGS:  LUNGS AND PLEURA: No opacities, masses or pneumothorax. No pleural effusion.

MEDIASTINUM AND HILAR STRUCTURES: No masses or contour abnormalities.

HEART AND VASCULAR STRUCTURES: Heart normal size.  No evidence for failure.

BONES: No acute findings.

HARDWARE: Surgical clips left axilla.  Post bilateral mastectomy.

OTHER: No other significant finding.



IMPRESSION:  No acute findings



TECHNICAL DOCUMENTATION:  JOB ID:  [id number]

 2011 [employer]- All Rights Reserved



Reading location - IP/workstation name: [email or web address]

## 2020-01-25 NOTE — ER DOCUMENT REPORT
ED Medical Screen (RME)





- General


Chief Complaint: Chest Pain


Stated Complaint: CHEST PAIN


Time Seen by Provider: 01/25/20 13:46


Notes: 





Patient is a 79-year-old female with a history of A. fib who presents emergency 

department with a chief complaint of chest pressure.  Patient reports yesterday 

she developed chest pressure while in a sitting position.  Patient reports at 

that time she was not having any cough or other symptoms.  Patient reports she 

was recently diagnosed with bronchitis and treated with a cough medication and 

an antibiotic.  Patient did finish her last dose of antibiotic this morning 

which she did vomit.  Patient reports the cough is actually improved.  Patient 

reports feeling weak.  Patient denies palpitations.


TRAVEL OUTSIDE OF THE [place] IN LAST 30 DAYS: No





- Related Data


Allergies/Adverse Reactions: 


                                        





No Known Allergies Allergy (Verified 01/25/20 13:43)


   











Past Medical History





- Social History


Chew tobacco use (# tins/day): No


Frequency of alcohol use: None


Drug Abuse: None





- Past Medical History


Cardiac Medical History: Reports: Hx Atrial Fibrillation - TAKES ELIQUIS, Hx 

Hypercholesterolemia, Hx Hypertension


Renal/ Medical History: Denies: Hx Peritoneal Dialysis


Malignancy Medical History: Reports: Hx Breast Cancer


Past Surgical History: Reports: Hx Breast Surgery





Physical Exam





- Vital signs


Vitals: 





                                        











Temp Pulse Resp BP Pulse Ox


 


 98.3 F   66   16   137/53 H  99 


 


 01/25/20 13:15  01/25/20 13:15  01/25/20 13:15  01/25/20 13:15  01/25/20 13:15














- Cardiovascular


Rhythm: Regular


Heart sounds: Normal auscultation, S1 appreciated, S2 appreciated





Course





- Re-evaluation


Re-evalutation: 





01/25/20 13:47


I have greeted and performed a rapid initial assessment of this patient.  A 

comprehensive ED assessment and evaluation of the patient, analysis of test 

results and completion of the medical decision making process will be conducted 

by additional ED providers.





- Vital Signs


Vital signs: 





                                        











Temp Pulse Resp BP Pulse Ox


 


 98.3 F   66   16   137/53 H  99 


 


 01/25/20 13:15  01/25/20 13:15  01/25/20 13:15  01/25/20 13:15  01/25/20 13:15

## 2020-01-25 NOTE — EKG REPORT
SEVERITY:- BORDERLINE ECG -

SINUS RHYTHM

BORDERLINE T ABNORMALITIES, ANT-LAT LEADS

:

Confirmed by: Luciano Mireles MD 25-Jan-2020 15:04:48

## 2020-02-16 ENCOUNTER — HOSPITAL ENCOUNTER (EMERGENCY)
Dept: HOSPITAL 62 - ER | Age: 80
Discharge: HOME | End: 2020-02-16
Payer: MEDICARE

## 2020-02-16 VITALS — SYSTOLIC BLOOD PRESSURE: 109 MMHG | DIASTOLIC BLOOD PRESSURE: 66 MMHG

## 2020-02-16 DIAGNOSIS — I48.0: ICD-10-CM

## 2020-02-16 DIAGNOSIS — R41.82: Primary | ICD-10-CM

## 2020-02-16 DIAGNOSIS — I10: ICD-10-CM

## 2020-02-16 DIAGNOSIS — F03.91: ICD-10-CM

## 2020-02-16 DIAGNOSIS — Z79.01: ICD-10-CM

## 2020-02-16 LAB
ADD MANUAL DIFF: NO
ALBUMIN SERPL-MCNC: 3.7 G/DL (ref 3.5–5)
ALP SERPL-CCNC: 88 U/L (ref 38–126)
ANION GAP SERPL CALC-SCNC: 11 MMOL/L (ref 5–19)
APPEARANCE UR: (no result)
APTT PPP: YELLOW S
AST SERPL-CCNC: 48 U/L (ref 14–36)
BARBITURATES UR QL SCN: NEGATIVE
BASOPHILS # BLD AUTO: 0 10^3/UL (ref 0–0.2)
BASOPHILS NFR BLD AUTO: 0.7 % (ref 0–2)
BILIRUB DIRECT SERPL-MCNC: 0.2 MG/DL (ref 0–0.4)
BILIRUB SERPL-MCNC: 1 MG/DL (ref 0.2–1.3)
BILIRUB UR QL STRIP: NEGATIVE
BUN SERPL-MCNC: 5 MG/DL (ref 7–20)
CALCIUM: 9.3 MG/DL (ref 8.4–10.2)
CHLORIDE SERPL-SCNC: 110 MMOL/L (ref 98–107)
CO2 SERPL-SCNC: 25 MMOL/L (ref 22–30)
EOSINOPHIL # BLD AUTO: 0 10^3/UL (ref 0–0.6)
EOSINOPHIL NFR BLD AUTO: 0.3 % (ref 0–6)
ERYTHROCYTE [DISTWIDTH] IN BLOOD BY AUTOMATED COUNT: 15.2 % (ref 11.5–14)
GLUCOSE SERPL-MCNC: 94 MG/DL (ref 75–110)
GLUCOSE UR STRIP-MCNC: NEGATIVE MG/DL
HCT VFR BLD CALC: 45.3 % (ref 36–47)
HGB BLD-MCNC: 15.2 G/DL (ref 12–15.5)
KETONES UR STRIP-MCNC: NEGATIVE MG/DL
LYMPHOCYTES # BLD AUTO: 0.6 10^3/UL (ref 0.5–4.7)
LYMPHOCYTES NFR BLD AUTO: 11.8 % (ref 13–45)
MCH RBC QN AUTO: 30.9 PG (ref 27–33.4)
MCHC RBC AUTO-ENTMCNC: 33.5 G/DL (ref 32–36)
MCV RBC AUTO: 92 FL (ref 80–97)
METHADONE UR QL SCN: NEGATIVE
MONOCYTES # BLD AUTO: 0.7 10^3/UL (ref 0.1–1.4)
MONOCYTES NFR BLD AUTO: 13.7 % (ref 3–13)
NEUTROPHILS # BLD AUTO: 3.8 10^3/UL (ref 1.7–8.2)
NEUTS SEG NFR BLD AUTO: 73.5 % (ref 42–78)
NITRITE UR QL STRIP: NEGATIVE
PCP UR QL SCN: NEGATIVE
PH UR STRIP: 6 [PH] (ref 5–9)
PLATELET # BLD: 222 10^3/UL (ref 150–450)
POTASSIUM SERPL-SCNC: 3.7 MMOL/L (ref 3.6–5)
PROT SERPL-MCNC: 6.9 G/DL (ref 6.3–8.2)
PROT UR STRIP-MCNC: NEGATIVE MG/DL
RBC # BLD AUTO: 4.91 10^6/UL (ref 3.72–5.28)
SP GR UR STRIP: 1.01
TOTAL CELLS COUNTED % (AUTO): 100 %
URINE AMPHETAMINES SCREEN: NEGATIVE
URINE BENZODIAZEPINES SCREEN: NEGATIVE
URINE COCAINE SCREEN: NEGATIVE
URINE MARIJUANA (THC) SCREEN: NEGATIVE
UROBILINOGEN UR-MCNC: NEGATIVE MG/DL (ref ?–2)
WBC # BLD AUTO: 5.2 10^3/UL (ref 4–10.5)

## 2020-02-16 PROCEDURE — 70450 CT HEAD/BRAIN W/O DYE: CPT

## 2020-02-16 PROCEDURE — 93005 ELECTROCARDIOGRAM TRACING: CPT

## 2020-02-16 PROCEDURE — 85025 COMPLETE CBC W/AUTO DIFF WBC: CPT

## 2020-02-16 PROCEDURE — 84484 ASSAY OF TROPONIN QUANT: CPT

## 2020-02-16 PROCEDURE — 36415 COLL VENOUS BLD VENIPUNCTURE: CPT

## 2020-02-16 PROCEDURE — 82962 GLUCOSE BLOOD TEST: CPT

## 2020-02-16 PROCEDURE — 83735 ASSAY OF MAGNESIUM: CPT

## 2020-02-16 PROCEDURE — 80053 COMPREHEN METABOLIC PANEL: CPT

## 2020-02-16 PROCEDURE — 80307 DRUG TEST PRSMV CHEM ANLYZR: CPT

## 2020-02-16 PROCEDURE — 99285 EMERGENCY DEPT VISIT HI MDM: CPT

## 2020-02-16 PROCEDURE — 96360 HYDRATION IV INFUSION INIT: CPT

## 2020-02-16 PROCEDURE — 71045 X-RAY EXAM CHEST 1 VIEW: CPT

## 2020-02-16 PROCEDURE — 93010 ELECTROCARDIOGRAM REPORT: CPT

## 2020-02-16 PROCEDURE — 81001 URINALYSIS AUTO W/SCOPE: CPT

## 2020-02-16 NOTE — RADIOLOGY REPORT (SQ)
EXAM DESCRIPTION:  CHEST SINGLE VIEW



COMPLETED DATE/TIME:  2/16/2020 3:54 pm



REASON FOR STUDY:  AMS



COMPARISON:  1/25/2020.



EXAM PARAMETERS:  NUMBER OF VIEWS: One view.

TECHNIQUE: Single frontal radiographic view of the chest acquired.

RADIATION DOSE: NA

LIMITATIONS: None.



FINDINGS:  LUNGS AND PLEURA: No opacities, masses or pneumothorax. No pleural effusion.

MEDIASTINUM AND HILAR STRUCTURES: No masses.  Contour normal.

HEART AND VASCULAR STRUCTURES: Heart normal in size.  Normal vasculature.

BONES: No acute findings.

HARDWARE: None in the chest.

OTHER: No other significant finding.



IMPRESSION:  NO ACUTE RADIOGRAPHIC FINDING IN THE CHEST.



TECHNICAL DOCUMENTATION:  JOB ID:  [id number]

 2011 [employer]- All Rights Reserved



Reading location - IP/workstation name: [email or web address]

## 2020-02-16 NOTE — ER DOCUMENT REPORT
ED General





- General


Chief Complaint: Altered Mental Status


Stated Complaint: ALTERED MENTAL STATUS


Time Seen by Provider: 02/16/20 15:27


TRAVEL OUTSIDE OF THE [place] IN LAST 30 DAYS: No





- HPI


Notes: 





Patient is a 79-year-old female with a history of hypertension, paroxysmal 

atrial fibrillation and on Eliquis who presents with daughter with concern of 

altered mental status that began this morning upon waking.  Daughter states that

she was more fatigued than normal and did not want to get up.  She has been 

laying around most of the day when she is usually up walking and cooking.  She 

has been on Levaquin 500 mg since this past Tuesday for pneumonia which she has 

otherwise been tolerating well.  Daughter states that her cough has been 

improving.  She is urinating normally and having normal bowel movements.  Denies

any headache, fever, neck pain, URI, sore throat, chest pain, palpitations, 

syncope, shortness of breath, wheeze, dyspnea, abdominal pain, 

nausea/vomiting/diarrhea, urinary retention, dysuria, hematuria, loss of control

of bowel or bladder, numbness/tingling, muscle paralysis, or rash.





- Related Data


Allergies/Adverse Reactions: 


                                        





No Known Allergies Allergy (Verified 01/25/20 13:43)


   











Past Medical History





- Social History


Smoking Status: Unknown if Ever Smoked


Family History: Reviewed & Not Pertinent


Patient has suicidal ideation: No


Patient has homicidal ideation: No





- Past Medical History


Cardiac Medical History: Reports: Hx Atrial Fibrillation - TAKES ELIQUIS, Hx 

Hypercholesterolemia, Hx Hypertension


Renal/ Medical History: Denies: Hx Peritoneal Dialysis


Malignancy Medical History: Reports: Hx Breast Cancer


Past Surgical History: Reports: Hx Breast Surgery





- Immunizations


Hx Pneumococcal Vaccination: 10/01/15





Review of Systems





- Review of Systems


-: Yes All other systems reviewed and negative





Physical Exam





- Vital signs


Vitals: 


                                        











Resp Pulse Ox


 


 22 H  96 


 


 02/16/20 14:13  02/16/20 14:13














- Notes


Notes: 





PHYSICAL EXAMINATION:  





GENERAL: no acute distress.  A&O to person/place, but not to time which would be

her baseline.  Answers questions appropriately.  Appears fatigued.





HEAD: Atraumatic, normocephalic.  Non-tender. 





EYES: Pupils equal round and reactive to light, extraocular movements intact, 

sclera anicteric, conjunctiva are normal.  No nystagmus.  





ENT: EAC clear b/l.  TM's intact b/l without erythema, fluid, or perforation.  N

malinda patent and without discharge.  oropharynx clear without exudates.  No 

tonsilar hypertrophy or erythema.  Moist mucous membranes. 





NECK: Normal range of motion, supple without lymphadenopathy.  No 

rigidity/meningismus.  No midline tenderness. 





LUNGS: Breath sounds clear to auscultation bilaterally and equal.  No wheezes 

rales or rhonchi.





HEART: Regular rate and rhythm without murmurs, rubs, gallops.





ABDOMEN: Soft, nontender, nondistended abdomen.  No guarding, no rebound.  

Normal bowel sounds present.  No CVA tenderness bilaterally.  





Musculoskeletal: Ext b/l:  FROM to passive/active. Strength 4+/5 bilaterally.  

No deficits noted.  No bony tenderness of extremities.





Extremities:  No cyanosis, clubbing, or edema b/l.  Peripheral pulses 2+.  

Capillary refill less than 2 seconds.





NEUROLOGICAL: NIH 0.  GCS 15.  Cranial nerves grossly intact.  Normal speech.  

Normal sensory, motor exams.  





PSYCH: flat affect





SKIN: Warm, Dry, normal turgor, no rashes or lesions noted.





Course





- Re-evaluation


Re-evalutation: 





02/16/20 


I did review with Dr. Patel who is in agreement with dispo/plan:


Patient is an afebrile, well-hydrated, 79-year-old female who presents with 

fatigue and altered mental status with behavior today that has since completely 

resolved.  She is acting behaving normally per family at this time.  Patient 

states that she is feeling good and would like to go home.  Vitals are 

acceptable without significant tachycardia, tachypnea, hypoxia, hypotension.  PE

is otherwise unremarkable for any focal neurological deficits.  Patient is 

nontoxic-appearing and is tolerating p.o. without difficulty.  Labs and imaging 

unremarkable.  No further work-up warranted at this time.  I did review 

admission versus discharge to home with family and patient would like to go home

and the family agrees to continue monitoring closely with strict return 

precautions.  Low suspicion for any acute glaucoma, temporal arteritis, 

meningitis, intracranial hemorrhage, ischemic stroke, or fracture at this time. 

Patient/family is aware that condition can change from initial presentation and 

to monitor symptoms closely for any acute changes.  Recheck with your family 

doctor this week.  Return to the ED with any other worsening/concerning 

symptoms.  They are in agreement.





- Vital Signs


Vital signs: 


                                        











Temp Pulse Resp BP Pulse Ox


 


 97.9 F      26 H  116/63   100 


 


 02/16/20 15:00     02/16/20 15:01  02/16/20 15:00  02/16/20 15:01














- Laboratory


Result Diagrams: 


                                 02/16/20 14:40





                                 02/16/20 14:40


Laboratory results interpreted by me: 


                                        











  02/16/20 02/16/20 02/16/20





  14:40 14:40 16:46


 


RDW  15.2 H  


 


Lymph % (Auto)  11.8 L  


 


Mono % (Auto)  13.7 H  


 


Sodium   146.3 H 


 


Chloride   110 H 


 


BUN   5 L 


 


Est GFR (MDRD) Non-Af   57 L 


 


AST   48 H 


 


Ur Leukocyte Esterase    MODERATE H














Discharge





- Discharge


Clinical Impression: 


Altered mental status


Qualifiers:


 Altered mental status type: unspecified Qualified Code(s): R41.82 - Altered 

mental status, unspecified





Dementia


Qualifiers:


 Dementia type: unspecified type Dementia behavioral disturbance: with 

behavioral disturbance Qualified Code(s): F03.91 - Unspecified dementia with 

behavioral disturbance





Condition: Stable


Disposition: HOME, SELF-CARE


Additional Instructions: 


Monitor opiate/pain medicine dose very closely and consider decreasing the dose 

for a few days.





Maintain adequate fluid and food intake


Healthy diet


Monitor for any worsening symptoms


Make sure you are staying hydrated enough to urinate and have normal BM's


Recheck with your PCM in 2-3 days


Return to the ED with any worsening symptoms and/or development of fever, 

headache, changes in pupillary size, drooping of face, changes in 

behavior/mentation/vision/speech, chest pain, palpitations, syncope, shortness 

of breath, trouble breathing, abdominal pain, n/v/d, blood in stool/urine, loss 

of control of bowel/bladder, urinary retention, muscle weakness/paralysis, 

saddle anesthesia, numbness/tingling, or other worsening symptoms that are 

concerning to you.


Referrals: 


KRYSTINA MACKENZIE DO [Primary Care Provider] - 02/18/20

## 2020-02-16 NOTE — RADIOLOGY REPORT (SQ)
EXAM DESCRIPTION:  CT HEAD WITHOUT



COMPLETED DATE/TIME:  2/16/2020 4:21 pm



REASON FOR STUDY:  altered mental status



COMPARISON:  8/19/2018



TECHNIQUE:  Axial images acquired through the brain without intravenous contrast. Images reviewed wit
h bone, brain and subdural windows.  Images stored on PACS.

All CT scanners at this facility use dose modulation, iterative reconstruction, and/or weight based d
osing when appropriate to reduce radiation dose to as low as reasonably achievable (ALARA).

[facility]: Dose Right  [facility]: CareDose    [facility]: Dose Right    [facility]: Teradose 4D    [facility]: Smart Technologies



RADIATION DOSE:  CT Rad equipment meets quality standard of care and radiation dose reduction techniq
ues were employed. CTDIvol: 53.2 mGy. DLP: 964 mGy-cm..



LIMITATIONS:  None.



FINDINGS:  VENTRICLES: Normal size and contour.

CEREBRUM: No masses. No hemorrhage. No midline shift. Age appropriate white matter. No evidence for a
cute infarction.

CEREBELLUM: No masses. No hemorrhage. No alteration of density. No evidence for acute infarction.

EXTRA-AXIAL SPACES: No fluid collections.

ORBITS AND GLOBE: No intra- or extraconal masses. Normal contour of globe without masses.

CALVARIUM: No fracture.

PARANASAL SINUSES: No fluid or mucosal thickening.

SOFT TISSUES: No mass or hematoma.

OTHER: No other significant finding.



IMPRESSION:  NO ACUTE INTRACRANIAL FINDINGS.

EVIDENCE OF ACUTE STROKE: NO.



TECHNICAL DOCUMENTATION:  JOB ID:  [id number]

[id number]

Quality ID # [id number]: Final reports with documentation of one or more dose reduction techniques (e.g., Au
tomated exposure control, adjustment of the mA and/or kV according to patient size, use of iterative 
reconstruction technique)

 2011 [employer]- All Rights Reserved



Reading location - IP/workstation name: [email or web address]